# Patient Record
Sex: MALE | Race: WHITE | NOT HISPANIC OR LATINO | URBAN - METROPOLITAN AREA
[De-identification: names, ages, dates, MRNs, and addresses within clinical notes are randomized per-mention and may not be internally consistent; named-entity substitution may affect disease eponyms.]

---

## 2019-02-26 ENCOUNTER — APPOINTMENT (EMERGENCY)
Dept: RADIOLOGY | Facility: HOSPITAL | Age: 19
End: 2019-02-26
Payer: COMMERCIAL

## 2019-02-26 ENCOUNTER — HOSPITAL ENCOUNTER (EMERGENCY)
Facility: HOSPITAL | Age: 19
Discharge: HOME | End: 2019-02-26
Attending: EMERGENCY MEDICINE
Payer: COMMERCIAL

## 2019-02-26 VITALS
TEMPERATURE: 97.9 F | HEIGHT: 69 IN | OXYGEN SATURATION: 98 % | WEIGHT: 170 LBS | DIASTOLIC BLOOD PRESSURE: 72 MMHG | RESPIRATION RATE: 18 BRPM | SYSTOLIC BLOOD PRESSURE: 160 MMHG | BODY MASS INDEX: 25.18 KG/M2 | HEART RATE: 80 BPM

## 2019-02-26 DIAGNOSIS — N45.3 EPIDIDYMOORCHITIS: Primary | ICD-10-CM

## 2019-02-26 LAB
BILIRUB UR QL STRIP.AUTO: NEGATIVE MG/DL
CLARITY UR REFRACT.AUTO: CLEAR
COLOR UR AUTO: YELLOW
GLUCOSE UR STRIP.AUTO-MCNC: NEGATIVE MG/DL
HGB UR QL STRIP.AUTO: NEGATIVE
KETONES UR STRIP.AUTO-MCNC: NEGATIVE MG/DL
LEUKOCYTE ESTERASE UR QL STRIP.AUTO: NEGATIVE
NITRITE UR QL STRIP.AUTO: NEGATIVE
PH UR STRIP.AUTO: 7 [PH]
PROT UR QL STRIP.AUTO: NEGATIVE
SP GR UR REFRACT.AUTO: 1.01
UROBILINOGEN UR STRIP-ACNC: 0.2 EU/DL

## 2019-02-26 PROCEDURE — 63700000 HC SELF-ADMINISTRABLE DRUG: Performed by: PHYSICIAN ASSISTANT

## 2019-02-26 PROCEDURE — 93975 VASCULAR STUDY: CPT

## 2019-02-26 PROCEDURE — 96372 THER/PROPH/DIAG INJ SC/IM: CPT

## 2019-02-26 PROCEDURE — 3E02329 INTRODUCTION OF OTHER ANTI-INFECTIVE INTO MUSCLE, PERCUTANEOUS APPROACH: ICD-10-PCS | Performed by: EMERGENCY MEDICINE

## 2019-02-26 PROCEDURE — 87491 CHLMYD TRACH DNA AMP PROBE: CPT | Performed by: PHYSICIAN ASSISTANT

## 2019-02-26 PROCEDURE — 81003 URINALYSIS AUTO W/O SCOPE: CPT | Performed by: PHYSICIAN ASSISTANT

## 2019-02-26 PROCEDURE — 76870 US EXAM SCROTUM: CPT

## 2019-02-26 PROCEDURE — 25000000 HC PHARMACY GENERAL: Performed by: PHYSICIAN ASSISTANT

## 2019-02-26 PROCEDURE — 63600000 HC DRUGS/DETAIL CODE: Performed by: PHYSICIAN ASSISTANT

## 2019-02-26 PROCEDURE — 99285 EMERGENCY DEPT VISIT HI MDM: CPT | Mod: 25

## 2019-02-26 RX ORDER — DOXYCYCLINE HYCLATE 100 MG
100 TABLET ORAL ONCE
Status: COMPLETED | OUTPATIENT
Start: 2019-02-26 | End: 2019-02-26

## 2019-02-26 RX ORDER — IBUPROFEN 400 MG/1
400 TABLET ORAL EVERY 8 HOURS PRN
Qty: 15 TABLET | Refills: 0 | Status: SHIPPED | OUTPATIENT
Start: 2019-02-26 | End: 2019-03-03

## 2019-02-26 RX ORDER — DOXYCYCLINE 100 MG/1
100 CAPSULE ORAL 2 TIMES DAILY
Qty: 20 CAPSULE | Refills: 0 | Status: SHIPPED | OUTPATIENT
Start: 2019-02-26 | End: 2019-03-08

## 2019-02-26 RX ORDER — IBUPROFEN 600 MG/1
600 TABLET ORAL ONCE
Status: COMPLETED | OUTPATIENT
Start: 2019-02-26 | End: 2019-02-26

## 2019-02-26 RX ADMIN — DOXYCYCLINE HYCLATE 100 MG: 100 TABLET ORAL at 20:47

## 2019-02-26 RX ADMIN — WATER 250 MG: 1 INJECTION INTRAMUSCULAR; INTRAVENOUS; SUBCUTANEOUS at 20:47

## 2019-02-26 RX ADMIN — IBUPROFEN 600 MG: 600 TABLET ORAL at 20:47

## 2019-02-26 ASSESSMENT — ENCOUNTER SYMPTOMS
ARTHRALGIAS: 0
CHILLS: 0
COLOR CHANGE: 0
PALPITATIONS: 0
BACK PAIN: 0
FEVER: 0
EYE PAIN: 0
VOMITING: 0
SORE THROAT: 0
DYSURIA: 0
COUGH: 0
HEMATURIA: 0
ABDOMINAL PAIN: 0
SEIZURES: 0
SHORTNESS OF BREATH: 0

## 2019-02-26 NOTE — ED PROVIDER NOTES
HPI     Chief Complaint   Patient presents with   • Groin Swelling     Pt c/o L testicle swelling that began earlier today. Pt also reports severe pain. Pt reports swelling has decreased, but pain constant.        18-year-old male with no reported PMH presenting for left testicular pain and swelling for the past 6 hours.  Patient reports he woke up from a nap around noon and his left testicle felt achy.  He reports the pain worsened and his testicle became swollen.  He reports the pain and swelling have improved since.  He denies any radiation of pain.  He reports he feels he is urinating more frequently since onset.  He denies any fever, dysuria, hematuria, penile discharge, rashes, back pain, abdominal pain.  He reports one sexual partner, always uses protection, denies history of STDs, and is not worried about STDs today             Patient History     History reviewed. No pertinent past medical history.    History reviewed. No pertinent surgical history.    History reviewed. No pertinent family history.    Social History   Substance Use Topics   • Smoking status: Never Smoker   • Smokeless tobacco: Never Used   • Alcohol use Yes      Comment: social       Systems Reviewed from Nursing Triage:          Review of Systems     Review of Systems   Constitutional: Negative for chills and fever.   HENT: Negative for ear pain and sore throat.    Eyes: Negative for pain and visual disturbance.   Respiratory: Negative for cough and shortness of breath.    Cardiovascular: Negative for chest pain and palpitations.   Gastrointestinal: Negative for abdominal pain and vomiting.   Genitourinary: Positive for testicular pain. Negative for dysuria and hematuria.   Musculoskeletal: Negative for arthralgias and back pain.   Skin: Negative for color change and rash.   Neurological: Negative for seizures and syncope.   All other systems reviewed and are negative.       Physical Exam     ED Triage Vitals [02/26/19 1806]   Temp Heart  "Rate Resp BP SpO2   36.6 °C (97.9 °F) 79 18 140/89 100 %      Temp Source Heart Rate Source Patient Position BP Location FiO2 (%) (Set)   Oral -- -- -- --                     Patient Vitals for the past 24 hrs:   BP Temp Temp src Pulse Resp SpO2 Height Weight   02/26/19 1806 140/89 36.6 °C (97.9 °F) Oral 79 18 100 % 1.753 m (5' 9\") 77.1 kg (170 lb)           Physical Exam   Constitutional: He is oriented to person, place, and time. He appears well-developed and well-nourished.   HENT:   Head: Normocephalic and atraumatic.   Eyes: Conjunctivae are normal.   Neck: Neck supple.   Cardiovascular: Normal rate, regular rhythm and intact distal pulses.    No murmur heard.  Pulmonary/Chest: Effort normal and breath sounds normal. No respiratory distress.   Abdominal: Soft. There is no tenderness.   Genitourinary:   Genitourinary Comments: No testicular tenderness.  Left testicle mildly enlarged compared to right.  Bilateral cremasteric reflex intact   Musculoskeletal: He exhibits no edema.   Neurological: He is alert and oriented to person, place, and time.   Moves all extremities   Skin: Skin is warm and dry. Capillary refill takes less than 2 seconds.   Psychiatric: He has a normal mood and affect.   Nursing note and vitals reviewed.           Procedures    ED Course & MDM     Labs Reviewed   CHLAMYDIA/GC DNA,PCR:URINE,SWAB   URINALYSIS REFLEX CULTURE    Narrative:     The following orders were created for panel order Urinalysis w/ reflex culture.  Procedure                               Abnormality         Status                     ---------                               -----------         ------                     UA Reflex to Culture (Mac...[[id number]]                                                   Please view results for these tests on the individual orders.   UA REFLEX CULTURE (MACROSCOPIC)       ULTRASOUND SCROTUM    (Results Pending)               MDM  Number of Diagnoses or Management " Options  Epididymoorchitis:   Diagnosis management comments: 18-year-old male presenting for left testicular pain and swelling.  Patient is in NAD.  VSS.    Ordered urinalysis, urine GC/C, scrotal ultrasound    Results reviewed and discussed with patient.  He was started on treatment for epididymal orchitis.  He was counseled on safe sex.  He was given urology follow-up.  He was given ER return instructions for any worsening condition, including signs/symptoms of torsion           ED Course as of Feb 26 2204   Tue Feb 26, 2019   1814 Spoke with [email or web address] to see if pt can have test performed next. US tech states they have one ER pt ahead of him  [RM]      ED Course User Index  [RM] Lucho Brown PA C         Clinical Impressions as of Feb 26 2204   Epididymoorchitis        Lucho Brown PA C  02/26/19 2204

## 2019-02-27 NOTE — ED ATTESTATION NOTE
The patient was evaluated and managed by the physician assistant.    DO Kang Kendall Rachael Engle, DO  02/27/19 0117

## 2019-02-27 NOTE — DISCHARGE INSTRUCTIONS
Practice safe sex, always use protection.  Inform recent partners to be tested and treated.  Do not engage in sexual activity for at least 10 days, and while symptomatic.     Followup with your primary doctor or referred urologist within 2-3 days  Return to the ER immediately for any worsening symptoms or other concerns

## 2019-02-28 LAB
C TRACH DNA SPEC QL NAA+PROBE: NOT DETECTED
N GONORRHOEA DNA SPEC QL NAA+PROBE: NOT DETECTED